# Patient Record
Sex: MALE | Race: WHITE | NOT HISPANIC OR LATINO
[De-identification: names, ages, dates, MRNs, and addresses within clinical notes are randomized per-mention and may not be internally consistent; named-entity substitution may affect disease eponyms.]

---

## 2022-12-07 ENCOUNTER — NON-APPOINTMENT (OUTPATIENT)
Age: 32
End: 2022-12-07

## 2022-12-07 ENCOUNTER — APPOINTMENT (OUTPATIENT)
Dept: BREAST CENTER | Facility: CLINIC | Age: 32
End: 2022-12-07
Payer: COMMERCIAL

## 2022-12-07 VITALS
HEART RATE: 71 BPM | BODY MASS INDEX: 29.03 KG/M2 | HEIGHT: 67 IN | WEIGHT: 185 LBS | SYSTOLIC BLOOD PRESSURE: 130 MMHG | DIASTOLIC BLOOD PRESSURE: 71 MMHG

## 2022-12-07 DIAGNOSIS — Z00.00 ENCOUNTER FOR GENERAL ADULT MEDICAL EXAMINATION W/OUT ABNORMAL FINDINGS: ICD-10-CM

## 2022-12-07 DIAGNOSIS — I89.0 LYMPHEDEMA, NOT ELSEWHERE CLASSIFIED: ICD-10-CM

## 2022-12-07 DIAGNOSIS — Z78.9 OTHER SPECIFIED HEALTH STATUS: ICD-10-CM

## 2022-12-07 DIAGNOSIS — R92.8 OTHER ABNORMAL AND INCONCLUSIVE FINDINGS ON DIAGNOSTIC IMAGING OF BREAST: ICD-10-CM

## 2022-12-07 PROCEDURE — 99204 OFFICE O/P NEW MOD 45 MIN: CPT

## 2022-12-07 RX ORDER — TAMOXIFEN CITRATE 20 MG/1
TABLET, FILM COATED ORAL
Refills: 0 | Status: ACTIVE | COMMUNITY

## 2022-12-07 NOTE — ASSESSMENT
[FreeTextEntry1] : 31 yo male with h/o left breast ca, ER+ stage IA\par plan left axillary u/s JUN 2023\par cont tamoxifen and surveillance per Dr. Goldberg\par \par We reviewed risk reduction strategies including maintaining a BMI <25, limiting red meat intake and alcoholic beverages to 3 per week and exercise (150 min/ week low intensity or 75 min/week high intensity). And maintaining a normal vitamin D level.\par \par LDEX baseline done today\par \par f/u 1 year\par She knows to call or return sooner should any concerns or questions arise.\par \par

## 2022-12-07 NOTE — CONSULT LETTER
[Dear  ___] : Dear  [unfilled], [( Thank you for referring [unfilled] for consultation for _____ )] : Thank you for referring [unfilled] for consultation for [unfilled] [Please see my note below.] : Please see my note below. [Consult Closing:] : Thank you very much for allowing me to participate in the care of this patient.  If you have any questions, please do not hesitate to contact me. [Sincerely,] : Sincerely, [FreeTextEntry3] : Diana Alfonso MS DO\par Breast Surgeon\par [facility] \[place] [address]\par  [DrBradley  ___] : Dr. TURNER

## 2022-12-07 NOTE — HISTORY OF PRESENT ILLNESS
[FreeTextEntry1] : This is a 33 yo male with history of left breast cancer who was referred by Dr. Watson for continuation of care. Patient is s/p left mastectomy with left sentinel lymph node biopsy on 05/17/2016 with Dr. Guallpa followed by closure with Dr. Romero. Surgical pathology revealed left breast invasive duct carcinoma with apocrine features associated intermediate grade DCIS with papillary and apocrine features. Tumor size 1.2 cm, grade 1. No LVI. Margins showed part A: present at unoriented cauterized inked resection surface. Part B mastectomy specimen showed no residual carcinoma. Biomarkers were ER+ (17%), MD+ (7%), HER-2 negative, with a Ki-67 of 9%. Left axillary sentinel lymph node 0/1. Oncotype Dx score was 40. He has been under the care of Dr. Goldberg, and he completed post adjuvant Taxotere/Cytoxan chemotherapy. Patient has been on adjuvant Tamoxifen with the goal to continue it for 10 years which will end in September 2026.\par \par Patient was noted to have two prominent lymph nodes in the left axilla on ultrasound 12/06/19, and recommendation was made for further imaging with MRI of the chest wall and axilla versus biopsy. Patient underwent left axillary USGBx on 12/12/19. The pathology report was unavailable at the time of the patient's visit, however, as per the USGBx report, histopathologic analysis of the specimen demonstrated scant fibroadipose tissue. No lymph node tissue identified. RPMI analysis demonstrated no immunophenotypic evidence of a T-cell or B cell neoplasm using an abbreviated panel due to limited hematopoetic cells in the specimen. Surgical excision was suggested. Patient underwent left axillary lymph node sampling and biopsy by Dr. Guallpa on 12/20/19. Four left axillary lymph nodes were negative for any evidence of metastatic carcinoma. \par   \par Of note, patient had a post-op seroma which had been drained several times and then became loculated. Patient was apparently able to break this up manually with massage on his own over several months and it seemed to resolve in ~2020.  He is currently undergoing sonographic evaluation of the nodes.\par \par He presents with his mother today for consultation.\par \par He does SBE.\par He has not noticed a change in her breast or a breast lump.\par He has not noticed a change in her nipple or nipple area.\par He has not noticed a change in the skin of the breast.\par He is not experiencing nipple discharge.\par He is not experiencing breast pain.\par He has not noticed a lump or lymph node under the armpit.\par \par BREAST CANCER RISK FACTORS\par Ashkenazi: N\par BRCA testing: [employer] 05/05/2016 - No clinically significant variants detected\par  \par Last mammogram: 12/07/17                 Location: [facility] \par Report reviewed.                                 Images reviewed.\par Results: Right mammography - BI-RADS 1 - Negative. No suspicious interval change. \par \par Last ultrasound: 12/6/2022                   Location: [facility]\par Report reviewed.                                 Images reviewed.\par Results: BI-RADS 3 - Likely stable findings. A lymph node in the left axilla has cortical thickness of 5mm, exactly identical when compared with the ultrasound from 11/30/2021\par \par \par \par  \par \par

## 2022-12-07 NOTE — PHYSICAL EXAM
[Normocephalic] : normocephalic [Atraumatic] : atraumatic [Supple] : supple [No Supraclavicular Adenopathy] : no supraclavicular adenopathy [Examined in the supine and seated position] : examined in the supine and seated position [No dominant masses] : no dominant masses in right breast  [No dominant masses] : no dominant masses left breast [No Nipple Retraction] : no right nipple retraction [No Nipple Discharge] : no right nipple discharge [No Axillary Lymphadenopathy] : no left axillary lymphadenopathy [No Edema] : no edema [No Rashes] : no rashes [No Ulceration] : no ulceration [de-identified] : s/p mastectomy, no masses, no skin changes

## 2023-02-16 ENCOUNTER — RESULT REVIEW (OUTPATIENT)
Age: 33
End: 2023-02-16

## 2023-02-17 ENCOUNTER — NON-APPOINTMENT (OUTPATIENT)
Age: 33
End: 2023-02-17

## 2023-02-21 ENCOUNTER — TRANSCRIPTION ENCOUNTER (OUTPATIENT)
Age: 33
End: 2023-02-21

## 2023-02-24 ENCOUNTER — APPOINTMENT (OUTPATIENT)
Dept: BREAST CENTER | Facility: CLINIC | Age: 33
End: 2023-02-24
Payer: COMMERCIAL

## 2023-02-24 ENCOUNTER — NON-APPOINTMENT (OUTPATIENT)
Age: 33
End: 2023-02-24

## 2023-02-24 VITALS
WEIGHT: 185 LBS | BODY MASS INDEX: 29.03 KG/M2 | DIASTOLIC BLOOD PRESSURE: 78 MMHG | HEIGHT: 67 IN | SYSTOLIC BLOOD PRESSURE: 136 MMHG | HEART RATE: 73 BPM

## 2023-02-24 DIAGNOSIS — N64.52 NIPPLE DISCHARGE: ICD-10-CM

## 2023-02-24 DIAGNOSIS — R59.9 ENLARGED LYMPH NODES, UNSPECIFIED: ICD-10-CM

## 2023-02-24 PROCEDURE — 99215 OFFICE O/P EST HI 40 MIN: CPT

## 2023-02-24 NOTE — HISTORY OF PRESENT ILLNESS
[FreeTextEntry1] : This is a 33 yo male with history of left breast cancer who was referred by Dr. Watson for continuation of care. Patient is s/p left mastectomy with left sentinel lymph node biopsy on 05/17/2016 with Dr. Guallpa followed by closure with Dr. Romero. Surgical pathology revealed left breast invasive duct carcinoma with apocrine features associated intermediate grade DCIS with papillary and apocrine features. Tumor size 1.2 cm, grade 1. No LVI. Margins showed part A: present at unoriented cauterized inked resection surface. Part B mastectomy specimen showed no residual carcinoma. Biomarkers were ER+ (17%), IL+ (7%), HER-2 negative, with a Ki-67 of 9%. Left axillary sentinel lymph node 0/1. Oncotype Dx score was 40. He has been under the care of Dr. Goldberg, and he completed post adjuvant Taxotere/Cytoxan chemotherapy. Patient has been on adjuvant Tamoxifen with the goal to continue it for 10 years which will end in September 2026.\par \par Patient was noted to have two prominent lymph nodes in the left axilla on ultrasound 12/06/19, and recommendation was made for further imaging with MRI of the chest wall and axilla versus biopsy. Patient underwent left axillary USGBx on 12/12/19. The pathology report was unavailable at the time of the patient's visit, however, as per the USGBx report, histopathologic analysis of the specimen demonstrated scant fibroadipose tissue. No lymph node tissue identified. RPMI analysis demonstrated no immunophenotypic evidence of a T-cell or B cell neoplasm using an abbreviated panel due to limited hematopoetic cells in the specimen. Surgical excision was suggested. Patient underwent left axillary lymph node sampling and biopsy by Dr. Guallpa on 12/20/19. Four left axillary lymph nodes were negative for any evidence of metastatic carcinoma. \par   \par Of note, patient had a post-op seroma which had been drained several times and then became loculated. Patient was apparently able to break this up manually with massage on his own over several months and it seemed to resolve in ~2020.  He is currently undergoing sonographic evaluation of the nodes.\par \par He presents with his mother today for consultation.\par \par He does SBE.\par He has not noticed a change in her breast or a breast lump.\par He has noticed a change in her right nipple or right nipple area.\par He has not noticed a change in the skin of the breast.\par He is experiencing clear right nipple discharge.\par He is not experiencing breast pain.\par He has not noticed a lump or lymph node under the armpit.\par \par BREAST CANCER RISK FACTORS\par Ashkenazi: N\par BRCA testing: [employer] 05/05/2016 - No clinically significant variants detected\par  \par Last mammogram: 2/17/2023                 Location: [facility]  \par Report reviewed.                                 Images reviewed.\par Results: BI-RADS 2\par No mammographic evidence of malignancy\par \par Last ultrasound: 2/17/2023                   Location: [facility]\par Report reviewed.                                 Images reviewed.\par Results: BI-RADS 2 \par No sonographic evidence of malignancy.

## 2023-02-24 NOTE — PHYSICAL EXAM
[Normocephalic] : normocephalic [Atraumatic] : atraumatic [Supple] : supple [No Supraclavicular Adenopathy] : no supraclavicular adenopathy [Examined in the supine and seated position] : examined in the supine and seated position [No dominant masses] : no dominant masses in right breast  [No dominant masses] : no dominant masses left breast [No Nipple Retraction] : no right nipple retraction [No Nipple Discharge] : no right nipple discharge [No Axillary Lymphadenopathy] : no left axillary lymphadenopathy [No Edema] : no edema [No Rashes] : no rashes [No Ulceration] : no ulceration [Symmetrical] : symmetrical [de-identified] : nipple normal, no masses, no discharge [de-identified] : s/p mastectomy, no masses, no skin changes,

## 2023-02-24 NOTE — CONSULT LETTER
[Dear  ___] : Dear  [unfilled], [( Thank you for referring [unfilled] for consultation for _____ )] : Thank you for referring [unfilled] for consultation for [unfilled] [Please see my note below.] : Please see my note below. [Consult Closing:] : Thank you very much for allowing me to participate in the care of this patient.  If you have any questions, please do not hesitate to contact me. [Sincerely,] : Sincerely, [DrBradley  ___] : Dr. TURNER [FreeTextEntry3] : Diana Alfonso MS DO\par Breast Surgeon\par [facility] \[place] [address]\par

## 2023-02-24 NOTE — ASSESSMENT
[FreeTextEntry1] : 33 yo male with h/o left breast ca, ER+ stage IA\par plan bilateral axillary u/s JUN 2023\par cont tamoxifen and surveillance per Dr. Goldberg he is also going for another opinion [facility] with Dr. Tabares\par discussed breast MRI as an option, discussed pros/cons and he is amenable\par \par We reviewed risk reduction strategies including maintaining a BMI <25, limiting red meat intake and alcoholic beverages to 3 per week and exercise (150 min/ week low intensity or 75 min/week high intensity). And maintaining a normal vitamin D level.\par \par \par f/u june after axillary imaging\par She knows to call or return sooner should any concerns or questions arise.\par \par

## 2023-07-07 ENCOUNTER — APPOINTMENT (OUTPATIENT)
Dept: BREAST CENTER | Facility: CLINIC | Age: 33
End: 2023-07-07
Payer: COMMERCIAL

## 2023-07-07 VITALS
WEIGHT: 185 LBS | HEIGHT: 67 IN | DIASTOLIC BLOOD PRESSURE: 84 MMHG | BODY MASS INDEX: 29.03 KG/M2 | HEART RATE: 72 BPM | SYSTOLIC BLOOD PRESSURE: 149 MMHG

## 2023-07-07 DIAGNOSIS — Z80.8 FAMILY HISTORY OF MALIGNANT NEOPLASM OF OTHER ORGANS OR SYSTEMS: ICD-10-CM

## 2023-07-07 DIAGNOSIS — Z78.9 OTHER SPECIFIED HEALTH STATUS: ICD-10-CM

## 2023-07-07 DIAGNOSIS — Z80.0 FAMILY HISTORY OF MALIGNANT NEOPLASM OF DIGESTIVE ORGANS: ICD-10-CM

## 2023-07-07 DIAGNOSIS — C50.929 MALIGNANT NEOPLASM OF UNSPECIFIED SITE OF UNSPECIFIED MALE BREAST: ICD-10-CM

## 2023-07-07 PROCEDURE — 99213 OFFICE O/P EST LOW 20 MIN: CPT

## 2023-07-07 NOTE — CONSULT LETTER
[Please see my note below.] : Please see my note below. [Consult Closing:] : Thank you very much for allowing me to participate in the care of this patient.  If you have any questions, please do not hesitate to contact me. [Sincerely,] : Sincerely, [Dear  ___] : Dear  [unfilled], [Courtesy Letter:] : I had the pleasure of seeing your patient, [unfilled], in my office today. [FreeTextEntry3] : Diana Alfonso MS DO\par Breast Surgeon\par [facility] \[place] [address]\par  [DrBradley  ___] : Dr. TURNER

## 2023-07-07 NOTE — ASSESSMENT
[FreeTextEntry1] : 32 yo male with h/o left breast ca, ER+ stage IA\par plan bilateral axillary u/s Jan 2024\par cont tamoxifen and surveillance per Dr. Virgen \par \par We reviewed risk reduction strategies including maintaining a BMI <25, limiting red meat intake and alcoholic beverages to 3 per week and exercise (150 min/ week low intensity or 75 min/week high intensity). And maintaining a normal vitamin D level.\par \par f/u 1 year\par She knows to call or return sooner should any concerns or questions arise.\par \par

## 2023-07-07 NOTE — PHYSICAL EXAM
[Normocephalic] : normocephalic [Atraumatic] : atraumatic [Supple] : supple [No Supraclavicular Adenopathy] : no supraclavicular adenopathy [Examined in the supine and seated position] : examined in the supine and seated position [Symmetrical] : symmetrical [No dominant masses] : no dominant masses in right breast  [No dominant masses] : no dominant masses left breast [No Nipple Retraction] : no right nipple retraction [No Nipple Discharge] : no right nipple discharge [No Axillary Lymphadenopathy] : no left axillary lymphadenopathy [No Edema] : no edema [No Rashes] : no rashes [No Ulceration] : no ulceration [de-identified] : nipple normal, no masses, no discharge [de-identified] : s/p mastectomy, no masses, no skin changes,

## 2023-07-07 NOTE — HISTORY OF PRESENT ILLNESS
[FreeTextEntry1] : This is a 34 yo male with history of left breast cancer who was referred by Dr. Watson for continuation of care. Patient is s/p left mastectomy with left sentinel lymph node biopsy on 05/17/2016 with Dr. Guallpa followed by closure with Dr. Romero. Surgical pathology revealed left breast invasive duct carcinoma with apocrine features associated intermediate grade DCIS with papillary and apocrine features. Tumor size 1.2 cm, grade 1. No LVI. Margins showed part A: present at unoriented cauterized inked resection surface. Part B mastectomy specimen showed no residual carcinoma. Biomarkers were ER+ (17%), NE+ (7%), HER-2 negative, with a Ki-67 of 9%. Left axillary sentinel lymph node 0/1. Oncotype Dx score was 40. He has been under the care of Dr. Goldberg, and he completed post adjuvant Taxotere/Cytoxan chemotherapy. Patient has been on adjuvant Tamoxifen with the goal to continue it for 10 years which will end in September 2026.\par \par Patient was noted to have two prominent lymph nodes in the left axilla on ultrasound 12/06/19, and recommendation was made for further imaging with MRI of the chest wall and axilla versus biopsy. Patient underwent left axillary USGBx on 12/12/19. The pathology report was unavailable at the time of the patient's visit, however, as per the USGBx report, histopathologic analysis of the specimen demonstrated scant fibroadipose tissue. No lymph node tissue identified. RPMI analysis demonstrated no immunophenotypic evidence of a T-cell or B cell neoplasm using an abbreviated panel due to limited hematopoetic cells in the specimen. Surgical excision was suggested. Patient underwent left axillary lymph node sampling and biopsy by Dr. Guallpa on 12/20/19. Four left axillary lymph nodes were negative for any evidence of metastatic carcinoma. \par   \par Of note, patient had a post-op seroma which had been drained several times and then became loculated. Patient was apparently able to break this up manually with massage on his own over several months and it seemed to resolve in ~2020.  He is currently undergoing sonographic evaluation of the nodes.\par \par He has stopped cold plunges and his nipple issues have resolved.\par \par \par He does SBE.\par He has not noticed a change in her breast or a breast lump.\par He has noticed a change in her right nipple or right nipple area.\par He has not noticed a change in the skin of the breast.\par He is experiencing clear right nipple discharge.\par He is not experiencing breast pain.\par He has not noticed a lump or lymph node under the armpit.\par \par BREAST CANCER RISK FACTORS\par Ashkenazi: N\par BRCA testing: [employer] 05/05/2016 - No clinically significant variants detected\par  \par Last mammogram: 2/17/2023                 Location: [facility]  \par Report reviewed.                                 Images reviewed.\par Results: BI-RADS 2\par No mammographic evidence of malignancy\par \par Last ultrasound: 7/9/2023                   Location: [facility]\par Report reviewed.                                 Images reviewed.\par Results: BI-RADS 2 \par Stable axillary lymph nodes. \par \par Last MRI: 3/15/2023                       Location: [facility]\par Report Reviewed                            Images reviewed.\par Results: BI-RADS 2\par S/p left mastectomy. No suspicious enhancing lesions have developed.

## 2024-01-22 ENCOUNTER — APPOINTMENT (OUTPATIENT)
Dept: BREAST CENTER | Facility: CLINIC | Age: 34
End: 2024-01-22
Payer: COMMERCIAL

## 2024-01-22 VITALS
HEIGHT: 67 IN | SYSTOLIC BLOOD PRESSURE: 143 MMHG | BODY MASS INDEX: 29.03 KG/M2 | HEART RATE: 67 BPM | DIASTOLIC BLOOD PRESSURE: 84 MMHG | WEIGHT: 185 LBS

## 2024-01-22 DIAGNOSIS — C50.912 MALIGNANT NEOPLASM OF UNSPECIFIED SITE OF LEFT FEMALE BREAST: ICD-10-CM

## 2024-01-22 PROCEDURE — 99214 OFFICE O/P EST MOD 30 MIN: CPT

## 2024-01-22 NOTE — HISTORY OF PRESENT ILLNESS
[FreeTextEntry1] : This is a 32 yo male with history of left breast cancer who was referred by Dr. Watson for continuation of care. Patient is s/p left mastectomy with left sentinel lymph node biopsy on 05/17/2016 with Dr. Guallpa followed by closure with Dr. Romero. Surgical pathology revealed left breast invasive duct carcinoma with apocrine features associated intermediate grade DCIS with papillary and apocrine features. Tumor size 1.2 cm, grade 1. No LVI. Margins showed part A: present at unoriented cauterized inked resection surface. Part B mastectomy specimen showed no residual carcinoma. Biomarkers were ER+ (17%), DC+ (7%), HER-2 negative, with a Ki-67 of 9%. Left axillary sentinel lymph node 0/1. Oncotype Dx score was 40. He has been under the care of Dr. Goldberg, and he completed post adjuvant Taxotere/Cytoxan chemotherapy. Patient has been on adjuvant Tamoxifen with the goal to continue it for 10 years which will end in September 2026.  Patient was noted to have two prominent lymph nodes in the left axilla on ultrasound 12/06/19, and recommendation was made for further imaging with MRI of the chest wall and axilla versus biopsy. Patient underwent left axillary USGBx on 12/12/19. The pathology report was unavailable at the time of the patient's visit, however, as per the USGBx report, histopathologic analysis of the specimen demonstrated scant fibroadipose tissue. No lymph node tissue identified. RPMI analysis demonstrated no immunophenotypic evidence of a T-cell or B cell neoplasm using an abbreviated panel due to limited hematopoetic cells in the specimen. Surgical excision was suggested. Patient underwent left axillary lymph node sampling and biopsy by Dr. Guallpa on 12/20/19. Four left axillary lymph nodes were negative for any evidence of metastatic carcinoma.     Of note, patient had a post-op seroma which had been drained several times and then became loculated. Patient was apparently able to break this up manually with massage on his own over several months and it seemed to resolve in ~2020.  He is currently undergoing sonographic evaluation of the nodes.  His nipple issues have resolved.   He does SBE. He has not noticed a change in her breast or a breast lump. He has noticed a change in her right nipple or right nipple area. He has not noticed a change in the skin of the breast. He is experiencing clear right nipple discharge. He is not experiencing breast pain. He has not noticed a lump or lymph node under the armpit.  BREAST CANCER RISK FACTORS Ashkenazi: N BRCA testing: [employer] 05/05/2016 - No clinically significant variants detected   Last mammogram: 2/17/2023                 Location: [facility]   Report reviewed.                                 Images reviewed. Results: BI-RADS 2 No mammographic evidence of malignancy  Last ultrasound: 7/9/2023                   Location: [facility] Report reviewed.                                 Images reviewed. Results: BI-RADS 2  Stable axillary lymph nodes.   Last MRI: 3/15/2023                       Location: [facility] Report Reviewed                            Images reviewed. Results: BI-RADS 2 S/p left mastectomy. No suspicious enhancing lesions have developed.

## 2024-01-22 NOTE — CONSULT LETTER
[Dear  ___] : Dear  [unfilled], [Courtesy Letter:] : I had the pleasure of seeing your patient, [unfilled], in my office today. [Please see my note below.] : Please see my note below. [Consult Closing:] : Thank you very much for allowing me to participate in the care of this patient.  If you have any questions, please do not hesitate to contact me. [Sincerely,] : Sincerely, [DrBradley  ___] : Dr. TURNER [FreeTextEntry3] : Diana Alfonso MS DO\par  Breast Surgeon\par  [facility] \[place] [address]\par

## 2024-01-22 NOTE — ASSESSMENT
[FreeTextEntry1] : 34 yo male with h/o left breast ca, ER+ stage IA  plan bilateral axillary u/s Jan 2024  cont tamoxifen and surveillance per Dr. Virgen  We reviewed risk reduction strategies including maintaining a BMI <25, limiting red meat intake and alcoholic beverages to 3 per week and exercise (150 min/ week low intensity or 75 min/week high intensity). And maintaining a normal vitamin D level.  f/u 1 year  She knows to call or return sooner should any concerns or questions arise.

## 2024-01-22 NOTE — PHYSICAL EXAM
[Normocephalic] : normocephalic [Atraumatic] : atraumatic [Supple] : supple [No Supraclavicular Adenopathy] : no supraclavicular adenopathy [Examined in the supine and seated position] : examined in the supine and seated position [Symmetrical] : symmetrical [No dominant masses] : no dominant masses in right breast  [No dominant masses] : no dominant masses left breast [No Nipple Retraction] : no right nipple retraction [No Nipple Discharge] : no right nipple discharge [No Axillary Lymphadenopathy] : no left axillary lymphadenopathy [No Edema] : no edema [No Rashes] : no rashes [No Ulceration] : no ulceration [de-identified] : nipple normal, no masses, no discharge [de-identified] : s/p mastectomy, no masses, no skin changes,

## 2024-09-09 ENCOUNTER — NON-APPOINTMENT (OUTPATIENT)
Age: 34
End: 2024-09-09

## 2025-02-10 ENCOUNTER — APPOINTMENT (OUTPATIENT)
Dept: BREAST CENTER | Facility: CLINIC | Age: 35
End: 2025-02-10
Payer: COMMERCIAL

## 2025-02-10 VITALS
HEIGHT: 67 IN | SYSTOLIC BLOOD PRESSURE: 124 MMHG | HEART RATE: 78 BPM | WEIGHT: 185 LBS | DIASTOLIC BLOOD PRESSURE: 71 MMHG | BODY MASS INDEX: 29.03 KG/M2

## 2025-02-10 DIAGNOSIS — C50.929 MALIGNANT NEOPLASM OF UNSPECIFIED SITE OF UNSPECIFIED MALE BREAST: ICD-10-CM

## 2025-02-10 DIAGNOSIS — I89.0 LYMPHEDEMA, NOT ELSEWHERE CLASSIFIED: ICD-10-CM

## 2025-02-10 DIAGNOSIS — C50.912 MALIGNANT NEOPLASM OF UNSPECIFIED SITE OF LEFT FEMALE BREAST: ICD-10-CM

## 2025-02-10 PROCEDURE — 99214 OFFICE O/P EST MOD 30 MIN: CPT
